# Patient Record
Sex: MALE | Race: WHITE | NOT HISPANIC OR LATINO | Employment: FULL TIME | URBAN - METROPOLITAN AREA
[De-identification: names, ages, dates, MRNs, and addresses within clinical notes are randomized per-mention and may not be internally consistent; named-entity substitution may affect disease eponyms.]

---

## 2020-12-10 ENCOUNTER — OFFICE VISIT (OUTPATIENT)
Dept: PODIATRY | Facility: CLINIC | Age: 35
End: 2020-12-10
Payer: OTHER GOVERNMENT

## 2020-12-10 VITALS
BODY MASS INDEX: 33.6 KG/M2 | HEART RATE: 88 BPM | HEIGHT: 71 IN | WEIGHT: 240 LBS | DIASTOLIC BLOOD PRESSURE: 89 MMHG | SYSTOLIC BLOOD PRESSURE: 135 MMHG | TEMPERATURE: 98 F

## 2020-12-10 DIAGNOSIS — B35.1 ONYCHOMYCOSIS DUE TO DERMATOPHYTE: Primary | ICD-10-CM

## 2020-12-10 DIAGNOSIS — M19.079 OSTEOARTHRITIS OF ANKLE AND FOOT, UNSPECIFIED LATERALITY: ICD-10-CM

## 2020-12-10 PROCEDURE — 99203 PR OFFICE/OUTPT VISIT, NEW, LEVL III, 30-44 MIN: ICD-10-PCS | Mod: S$PBB,,, | Performed by: PODIATRIST

## 2020-12-10 PROCEDURE — 99203 OFFICE O/P NEW LOW 30 MIN: CPT | Mod: PBBFAC,PN | Performed by: PODIATRIST

## 2020-12-10 PROCEDURE — 99999 PR PBB SHADOW E&M-NEW PATIENT-LVL III: ICD-10-PCS | Mod: PBBFAC,,, | Performed by: PODIATRIST

## 2020-12-10 PROCEDURE — 99203 OFFICE O/P NEW LOW 30 MIN: CPT | Mod: S$PBB,,, | Performed by: PODIATRIST

## 2020-12-10 PROCEDURE — 99999 PR PBB SHADOW E&M-NEW PATIENT-LVL III: CPT | Mod: PBBFAC,,, | Performed by: PODIATRIST

## 2020-12-10 RX ORDER — RIZATRIPTAN BENZOATE 10 MG/1
10 TABLET, ORALLY DISINTEGRATING ORAL
COMMUNITY

## 2020-12-10 NOTE — LETTER
December 13, 2020      [address]   [facility] [place] [address]           [facility] - Podiatry/Wound Care  [facility] [facility] [place] [address]  Phone: [phone]  Fax: [phone]          Patient: John Mello   MR Number: [id number]   YOB: 1985   Date of Visit: 12/10/2020       Dear [facility]:    Thank you for referring John Mello to me for evaluation. Attached you will find relevant portions of my assessment and plan of care.    If you have questions, please do not hesitate to call me. I look forward to following John Mello along with you.    Sincerely,    Dylon Castro, PACHECO    Enclosure  CC:  No Recipients    If you would like to receive this communication electronically, please contact [email or web address] or [phone] to request more information on [employer] access.    For providers and/or their staff who would like to refer a patient to [facility], please contact us through our one-stop-shop provider referral line, [facility] , at [phone].    If you feel you have received this communication in error or would no longer like to receive these types of communications, please e-mail [email or web address]

## 2020-12-12 PROBLEM — M19.079 OSTEOARTHRITIS OF ANKLE AND FOOT: Status: ACTIVE | Noted: 2020-12-12

## 2020-12-12 PROBLEM — B35.1 ONYCHOMYCOSIS DUE TO DERMATOPHYTE: Status: ACTIVE | Noted: 2020-12-12

## 2020-12-13 NOTE — PROGRESS NOTES
Subjective:       Patient ID: John Mello is a 35 y.o. male.    Chief Complaint: Nail Problem and Foot Problem   Patient presents today with a complaint of a fungally infected toenail on the 5th digit right foot he states he has taken 2 rounds of what sounds like Lamisil he states it is not clear this up he states the nail gets very thick it does rub in shoe and causes irritation.  Patient also has a lump on the top of his right foot that he states has become rubbed irritated and ulcerated previously.    History reviewed. No pertinent past medical history.  Past Surgical History:   Procedure Laterality Date    HIP FRACTURE SURGERY      right     MOUTH SURGERY      wisdom      Family History   Problem Relation Age of Onset    Diabetes Paternal Grandmother      Social History     Socioeconomic History    Marital status:      Spouse name: Not on file    Number of children: Not on file    Years of education: Not on file    Highest education level: Not on file   Occupational History    Not on file   Social Needs    Financial resource strain: Not on file    Food insecurity     Worry: Not on file     Inability: Not on file    Transportation needs     Medical: Not on file     Non-medical: Not on file   Tobacco Use    Smoking status: Never Smoker    Smokeless tobacco: Never Used   Substance and Sexual Activity    Alcohol use: Yes     Frequency: Monthly or less     Drinks per session: 1 or 2     Binge frequency: Less than monthly    Drug use: Not Currently    Sexual activity: Yes     Partners: Female   Lifestyle    Physical activity     Days per week: Not on file     Minutes per session: Not on file    Stress: Not on file   Relationships    Social connections     Talks on phone: Not on file     Gets together: Not on file     Attends Zoroastrianism service: Not on file     Active member of club or organization: Not on file     Attends meetings of clubs or organizations: Not on file     Relationship status:  "Not on file   Other Topics Concern    Not on file   Social History Narrative    Not on file       Current Outpatient Medications   Medication Sig Dispense Refill    rizatriptan (MAXALT-MLT) 10 MG disintegrating tablet Take 10 mg by mouth.       No current facility-administered medications for this visit.      Review of patient's allergies indicates:   Allergen Reactions    Diphenhydramine hcl Other (See Comments)     HYPERACTIVE       Review of Systems   Musculoskeletal: Positive for arthralgias.   All other systems reviewed and are negative.      Objective:      Vitals:    12/10/20 0818   BP: 135/89   Pulse: 88   Temp: 98.1 °F (36.7 °C)   Weight: 108.9 kg (240 lb)   Height: 5' 11" (1.803 m)     Physical Exam  Vitals signs and nursing note reviewed.   Constitutional:       Appearance: Normal appearance.   Cardiovascular:      Pulses:           Dorsalis pedis pulses are 2+ on the right side and 2+ on the left side.        Posterior tibial pulses are 2+ on the right side and 2+ on the left side.   Pulmonary:      Effort: Pulmonary effort is normal.   Musculoskeletal:         General: Tenderness and deformity present.      Right foot: Deformity present.      Left foot: Deformity present.   Feet:      Right foot:      Protective Sensation: 2 sites tested. 2 sites sensed.      Skin integrity: Ulcer, skin breakdown, erythema and warmth present.      Toenail Condition: Fungal disease present.     Left foot:      Protective Sensation: 2 sites tested. 2 sites sensed.   Skin:     General: Skin is warm.      Capillary Refill: Capillary refill takes less than 2 seconds.      Findings: Erythema present.   Neurological:      General: No focal deficit present.      Mental Status: He is alert.   Psychiatric:         Mood and Affect: Mood normal.         Behavior: Behavior normal.         Thought Content: Thought content normal.         Judgment: Judgment normal.                          Assessment:       1. Onychomycosis due to " dermatophyte    2. Osteoarthritis of ankle and foot, unspecified laterality        Plan:       Patient presents today with a complaint of a fungally infected toenail on the 5th digit right foot he states he has taken 2 rounds of what sounds like Lamisil he states it is not clear this up he states the nail gets very thick it does rub in shoe and causes irritation.  Patient also has a lump on the top of his right foot that he states has become rubbed irritated and ulcerated previously.  Patient states that he has had these lumps on the top of his feet for an extended period of time however he believes a new pair of boots that he got rubbed the lump on the top of the right foot the area became ulcerated subsequently infected he states it took a long time to heal and now stays very irritated.  Patient has spurring noted on the dorsal midfoot bilateral right greater than left he has palpable underlying deformity consistent with bone spur this appears to be at the level of the 1st metatarsal medial cuneiform joint.  Patient does have significantly elevated arches both weight-bearing and nonweightbearing bilateral I have advised him it is very common for people to develop these bone spurs overlying the midfoot due to an excessively elevated arch and this is right over the course of the extensor hallucis longus tendon which is certainly contributing to the irritation and inflammation.  Patient will get a referral through his insurance to evaluate this problem further he likely needs x-rays we can discuss surgical intervention to remove the bone spur certainly until then he needs to keep the area well protected monitored his primary reason for his visit today in his referral was the fungal toenail on the right great toe.  I did aggressively debride the fungal nail on the 5th digit right I have smooth the area out I have recommended the application of Vicks vapor rub twice a day every day for 4-6 months I have advised the  patient he should start to see good results I did not recommend any further oral medication at this time as it does not appear the previous oral medication was affective certainly we can look at something prescription topically but I would recommend Vicks vapor rub 1st which is usually very effective patient was in understanding and agreement with this today.  Follow-up recommended to re-evaluate area of bone spurring and ulceration dorsal midfoot right.  Face-to-face time equaled 30 min.This note was created using [employer] voice recognition software that occasionally misinterpreted phrases or words.